# Patient Record
Sex: FEMALE | Race: WHITE | NOT HISPANIC OR LATINO | Employment: STUDENT | URBAN - METROPOLITAN AREA
[De-identification: names, ages, dates, MRNs, and addresses within clinical notes are randomized per-mention and may not be internally consistent; named-entity substitution may affect disease eponyms.]

---

## 2023-06-20 ENCOUNTER — APPOINTMENT (OUTPATIENT)
Dept: ULTRASOUND IMAGING | Facility: CLINIC | Age: 33
End: 2023-06-20
Attending: EMERGENCY MEDICINE
Payer: COMMERCIAL

## 2023-06-20 ENCOUNTER — HOSPITAL ENCOUNTER (EMERGENCY)
Facility: CLINIC | Age: 33
Discharge: HOME | End: 2023-06-20
Attending: EMERGENCY MEDICINE | Admitting: EMERGENCY MEDICINE
Payer: COMMERCIAL

## 2023-06-20 ENCOUNTER — APPOINTMENT (OUTPATIENT)
Dept: CT IMAGING | Facility: CLINIC | Age: 33
End: 2023-06-20
Payer: COMMERCIAL

## 2023-06-20 ENCOUNTER — OFFICE VISIT (OUTPATIENT)
Dept: URGENT CARE | Facility: URGENT CARE | Age: 33
End: 2023-06-20
Payer: COMMERCIAL

## 2023-06-20 VITALS
HEART RATE: 85 BPM | RESPIRATION RATE: 22 BRPM | TEMPERATURE: 99.5 F | SYSTOLIC BLOOD PRESSURE: 113 MMHG | OXYGEN SATURATION: 98 % | DIASTOLIC BLOOD PRESSURE: 70 MMHG

## 2023-06-20 VITALS
RESPIRATION RATE: 20 BRPM | WEIGHT: 122 LBS | DIASTOLIC BLOOD PRESSURE: 72 MMHG | TEMPERATURE: 99.2 F | HEART RATE: 98 BPM | SYSTOLIC BLOOD PRESSURE: 113 MMHG | OXYGEN SATURATION: 100 %

## 2023-06-20 DIAGNOSIS — R10.11 RUQ ABDOMINAL PAIN: Primary | ICD-10-CM

## 2023-06-20 DIAGNOSIS — R11.0 NAUSEA: ICD-10-CM

## 2023-06-20 DIAGNOSIS — R68.83 CHILLS: ICD-10-CM

## 2023-06-20 DIAGNOSIS — R10.84 ABDOMINAL PAIN, GENERALIZED: ICD-10-CM

## 2023-06-20 LAB
ALBUMIN SERPL BCG-MCNC: 4.6 G/DL (ref 3.5–5.2)
ALBUMIN UR-MCNC: NEGATIVE MG/DL
ALP SERPL-CCNC: 69 U/L (ref 35–104)
ALT SERPL W P-5'-P-CCNC: 13 U/L (ref 0–50)
ANION GAP SERPL CALCULATED.3IONS-SCNC: 15 MMOL/L (ref 7–15)
APPEARANCE UR: CLEAR
AST SERPL W P-5'-P-CCNC: 20 U/L (ref 0–45)
BACTERIA #/AREA URNS HPF: ABNORMAL /HPF
BASOPHILS # BLD AUTO: 0 10E3/UL (ref 0–0.2)
BASOPHILS NFR BLD AUTO: 0 %
BILIRUB SERPL-MCNC: 0.7 MG/DL
BILIRUB UR QL STRIP: NEGATIVE
BUN SERPL-MCNC: 11 MG/DL (ref 6–20)
CALCIUM SERPL-MCNC: 9.1 MG/DL (ref 8.6–10)
CHLORIDE SERPL-SCNC: 102 MMOL/L (ref 98–107)
COLOR UR AUTO: ABNORMAL
CREAT SERPL-MCNC: 0.77 MG/DL (ref 0.51–0.95)
DEPRECATED HCO3 PLAS-SCNC: 21 MMOL/L (ref 22–29)
EOSINOPHIL # BLD AUTO: 0.4 10E3/UL (ref 0–0.7)
EOSINOPHIL NFR BLD AUTO: 3 %
ERYTHROCYTE [DISTWIDTH] IN BLOOD BY AUTOMATED COUNT: 11.9 % (ref 10–15)
GFR SERPL CREATININE-BSD FRML MDRD: >90 ML/MIN/1.73M2
GLUCOSE SERPL-MCNC: 89 MG/DL (ref 70–99)
GLUCOSE UR STRIP-MCNC: NEGATIVE MG/DL
HCG SERPL QL: NEGATIVE
HCT VFR BLD AUTO: 38.3 % (ref 35–47)
HGB BLD-MCNC: 13.5 G/DL (ref 11.7–15.7)
HGB UR QL STRIP: ABNORMAL
IMM GRANULOCYTES # BLD: 0.1 10E3/UL
IMM GRANULOCYTES NFR BLD: 1 %
KETONES UR STRIP-MCNC: 40 MG/DL
LEUKOCYTE ESTERASE UR QL STRIP: NEGATIVE
LIPASE SERPL-CCNC: 19 U/L (ref 13–60)
LYMPHOCYTES # BLD AUTO: 1.8 10E3/UL (ref 0.8–5.3)
LYMPHOCYTES NFR BLD AUTO: 13 %
MCH RBC QN AUTO: 32.1 PG (ref 26.5–33)
MCHC RBC AUTO-ENTMCNC: 35.2 G/DL (ref 31.5–36.5)
MCV RBC AUTO: 91 FL (ref 78–100)
MONOCYTES # BLD AUTO: 1 10E3/UL (ref 0–1.3)
MONOCYTES NFR BLD AUTO: 7 %
MUCOUS THREADS #/AREA URNS LPF: PRESENT /LPF
NEUTROPHILS # BLD AUTO: 10 10E3/UL (ref 1.6–8.3)
NEUTROPHILS NFR BLD AUTO: 76 %
NITRATE UR QL: NEGATIVE
NRBC # BLD AUTO: 0 10E3/UL
NRBC BLD AUTO-RTO: 0 /100
PH UR STRIP: 5 [PH] (ref 5–7)
PLATELET # BLD AUTO: 219 10E3/UL (ref 150–450)
POTASSIUM SERPL-SCNC: 3.7 MMOL/L (ref 3.4–5.3)
PROT SERPL-MCNC: 7.1 G/DL (ref 6.4–8.3)
RBC # BLD AUTO: 4.2 10E6/UL (ref 3.8–5.2)
RBC URINE: 3 /HPF
SODIUM SERPL-SCNC: 138 MMOL/L (ref 136–145)
SP GR UR STRIP: 1.01 (ref 1–1.03)
SQUAMOUS EPITHELIAL: <1 /HPF
UROBILINOGEN UR STRIP-MCNC: NORMAL MG/DL
WBC # BLD AUTO: 13.2 10E3/UL (ref 4–11)
WBC URINE: 3 /HPF

## 2023-06-20 PROCEDURE — 81001 URINALYSIS AUTO W/SCOPE: CPT | Performed by: EMERGENCY MEDICINE

## 2023-06-20 PROCEDURE — 74177 CT ABD & PELVIS W/CONTRAST: CPT

## 2023-06-20 PROCEDURE — 250N000011 HC RX IP 250 OP 636: Performed by: EMERGENCY MEDICINE

## 2023-06-20 PROCEDURE — 99285 EMERGENCY DEPT VISIT HI MDM: CPT | Mod: 25

## 2023-06-20 PROCEDURE — 76705 ECHO EXAM OF ABDOMEN: CPT

## 2023-06-20 PROCEDURE — 96375 TX/PRO/DX INJ NEW DRUG ADDON: CPT

## 2023-06-20 PROCEDURE — 99204 OFFICE O/P NEW MOD 45 MIN: CPT | Performed by: PHYSICIAN ASSISTANT

## 2023-06-20 PROCEDURE — 80053 COMPREHEN METABOLIC PANEL: CPT | Performed by: EMERGENCY MEDICINE

## 2023-06-20 PROCEDURE — 96374 THER/PROPH/DIAG INJ IV PUSH: CPT | Mod: 59

## 2023-06-20 PROCEDURE — 85025 COMPLETE CBC W/AUTO DIFF WBC: CPT | Performed by: EMERGENCY MEDICINE

## 2023-06-20 PROCEDURE — 84703 CHORIONIC GONADOTROPIN ASSAY: CPT | Performed by: EMERGENCY MEDICINE

## 2023-06-20 PROCEDURE — 36415 COLL VENOUS BLD VENIPUNCTURE: CPT | Performed by: EMERGENCY MEDICINE

## 2023-06-20 PROCEDURE — 83690 ASSAY OF LIPASE: CPT | Performed by: EMERGENCY MEDICINE

## 2023-06-20 RX ORDER — ONDANSETRON 2 MG/ML
4 INJECTION INTRAMUSCULAR; INTRAVENOUS ONCE
Status: COMPLETED | OUTPATIENT
Start: 2023-06-20 | End: 2023-06-20

## 2023-06-20 RX ORDER — KETOROLAC TROMETHAMINE 15 MG/ML
15 INJECTION, SOLUTION INTRAMUSCULAR; INTRAVENOUS ONCE
Status: COMPLETED | OUTPATIENT
Start: 2023-06-20 | End: 2023-06-20

## 2023-06-20 RX ORDER — EPINEPHRINE 0.3 MG/.3ML
INJECTION SUBCUTANEOUS
COMMUNITY
Start: 2023-03-27

## 2023-06-20 RX ORDER — IOPAMIDOL 755 MG/ML
500 INJECTION, SOLUTION INTRAVASCULAR ONCE
Status: COMPLETED | OUTPATIENT
Start: 2023-06-20 | End: 2023-06-20

## 2023-06-20 RX ORDER — ONDANSETRON 4 MG/1
4 TABLET, ORALLY DISINTEGRATING ORAL EVERY 8 HOURS PRN
Qty: 10 TABLET | Refills: 0 | Status: SHIPPED | OUTPATIENT
Start: 2023-06-20 | End: 2023-06-21

## 2023-06-20 RX ORDER — DEXTROAMPHETAMINE SULFATE, DEXTROAMPHETAMINE SACCHARATE, AMPHETAMINE SULFATE AND AMPHETAMINE ASPARTATE 5; 5; 5; 5 MG/1; MG/1; MG/1; MG/1
CAPSULE, EXTENDED RELEASE ORAL
COMMUNITY
Start: 2023-05-22

## 2023-06-20 RX ORDER — BUPROPION HYDROCHLORIDE 150 MG/1
300 TABLET ORAL EVERY MORNING
COMMUNITY
Start: 2023-05-22

## 2023-06-20 RX ORDER — CLINDAMYCIN PHOSPHATE 10 MG/G
GEL TOPICAL
COMMUNITY
Start: 2022-09-06

## 2023-06-20 RX ORDER — ALBUTEROL SULFATE 90 UG/1
AEROSOL, METERED RESPIRATORY (INHALATION)
COMMUNITY
Start: 2023-03-27

## 2023-06-20 RX ADMIN — KETOROLAC TROMETHAMINE 15 MG: 15 INJECTION, SOLUTION INTRAMUSCULAR; INTRAVENOUS at 20:28

## 2023-06-20 RX ADMIN — ONDANSETRON 4 MG: 2 INJECTION INTRAMUSCULAR; INTRAVENOUS at 20:27

## 2023-06-20 RX ADMIN — IOPAMIDOL 61 ML: 755 INJECTION, SOLUTION INTRAVENOUS at 21:55

## 2023-06-20 ASSESSMENT — ACTIVITIES OF DAILY LIVING (ADL)
ADLS_ACUITY_SCORE: 33
ADLS_ACUITY_SCORE: 35

## 2023-06-20 NOTE — PROGRESS NOTES
Assessment & Plan     RUQ abdominal pain    Patient has been having worsening RUQ abdominal pain, fever, nausea  She has been unable to eat as the pain is more localized to the RUQ and worsening pain  Due to ongoing and worsening pain patient is being sent to the ED for imaging, blood work and IV fluids  Patient has been instructed to not eat or drink as she will need     Chills    Patient has been fever and chills involved in her abdominal pain    Nausea    Nausea if associated with symptoms      Review of external notes as documented elsewhere in note       CONSULTATION/REFERRAL to ED for imaging, blood work and IV fluids    No follow-ups on file.    Sterling Mora, [facility], PA-C  [facility] ISAIAH Cortez is a 32 year old, presenting for the following health issues:  Abdominal Pain (RUQ and RLQ pain and some nausea for 24 hrs  to 2 days. LMP--last week)         View : No data to display.              HPI   Review of Systems   Constitutional, HEENT, cardiovascular, pulmonary, gi and gu systems are negative, except as otherwise noted.      Objective    /72   Pulse 98   Temp 99.2  F (37.3  C)   Resp 20   Wt 55.3 kg (122 lb)   LMP 06/13/2023 (Approximate)   SpO2 100%   There is no height or weight on file to calculate BMI.  Physical Exam   GENERAL: healthy, alert and no distress  RESP: lungs clear to auscultation - no rales, rhonchi or wheezes  CV: regular rate and rhythm, normal S1 S2, no S3 or S4, no murmur, click or rub, no peripheral edema and peripheral pulses strong  ABDOMEN: tenderness RUQ  MS: no gross musculoskeletal defects noted, no edema  SKIN: no suspicious lesions or rashes  NEURO: Normal strength and tone, mentation intact and speech normal  PSYCH: mentation appears normal, affect normal/bright

## 2023-06-20 NOTE — ED TRIAGE NOTES
Pt arrives from  for 7/10 RLQ pain x2 days. Denies GI hx. VSS. Denies chance of pregnancy. No meds PTA.      Triage Assessment     Row Name 06/20/23 3701       Triage Assessment (Adult)    Airway WDL WDL       Respiratory WDL    Respiratory WDL WDL       Cardiac WDL    Cardiac WDL WDL       Cognitive/Neuro/Behavioral WDL    Cognitive/Neuro/Behavioral WDL WDL

## 2023-06-21 ENCOUNTER — OFFICE VISIT (OUTPATIENT)
Dept: URGENT CARE | Facility: URGENT CARE | Age: 33
End: 2023-06-21
Payer: COMMERCIAL

## 2023-06-21 ENCOUNTER — HOSPITAL ENCOUNTER (EMERGENCY)
Facility: CLINIC | Age: 33
Discharge: HOME | End: 2023-06-21
Attending: EMERGENCY MEDICINE | Admitting: EMERGENCY MEDICINE
Payer: COMMERCIAL

## 2023-06-21 ENCOUNTER — NURSE TRIAGE (OUTPATIENT)
Dept: NURSING | Facility: CLINIC | Age: 33
End: 2023-06-21
Payer: COMMERCIAL

## 2023-06-21 VITALS
WEIGHT: 122 LBS | HEART RATE: 88 BPM | SYSTOLIC BLOOD PRESSURE: 122 MMHG | DIASTOLIC BLOOD PRESSURE: 73 MMHG | TEMPERATURE: 98 F | OXYGEN SATURATION: 100 %

## 2023-06-21 VITALS
OXYGEN SATURATION: 99 % | HEIGHT: 64 IN | DIASTOLIC BLOOD PRESSURE: 75 MMHG | TEMPERATURE: 98 F | BODY MASS INDEX: 20.83 KG/M2 | WEIGHT: 122 LBS | SYSTOLIC BLOOD PRESSURE: 119 MMHG | HEART RATE: 99 BPM | RESPIRATION RATE: 16 BRPM

## 2023-06-21 DIAGNOSIS — R10.11 RIGHT UPPER QUADRANT ABDOMINAL PAIN: ICD-10-CM

## 2023-06-21 DIAGNOSIS — R10.11 RUQ ABDOMINAL PAIN: Primary | ICD-10-CM

## 2023-06-21 LAB
ALBUMIN SERPL BCG-MCNC: 4.3 G/DL (ref 3.5–5.2)
ALBUMIN UR-MCNC: NEGATIVE MG/DL
ALP SERPL-CCNC: 68 U/L (ref 35–104)
ALT SERPL W P-5'-P-CCNC: 10 U/L (ref 0–50)
ANION GAP SERPL CALCULATED.3IONS-SCNC: 15 MMOL/L (ref 7–15)
APPEARANCE UR: CLEAR
AST SERPL W P-5'-P-CCNC: 15 U/L (ref 0–45)
BACTERIA #/AREA URNS HPF: ABNORMAL /HPF
BASOPHILS # BLD AUTO: 0 10E3/UL (ref 0–0.2)
BASOPHILS NFR BLD AUTO: 0 %
BILIRUB SERPL-MCNC: 0.5 MG/DL
BILIRUB UR QL STRIP: NEGATIVE
BUN SERPL-MCNC: 13 MG/DL (ref 6–20)
CALCIUM SERPL-MCNC: 9.3 MG/DL (ref 8.6–10)
CHLORIDE SERPL-SCNC: 99 MMOL/L (ref 98–107)
COLOR UR AUTO: ABNORMAL
CREAT SERPL-MCNC: 0.91 MG/DL (ref 0.51–0.95)
DEPRECATED HCO3 PLAS-SCNC: 22 MMOL/L (ref 22–29)
EOSINOPHIL # BLD AUTO: 0.4 10E3/UL (ref 0–0.7)
EOSINOPHIL NFR BLD AUTO: 3 %
ERYTHROCYTE [DISTWIDTH] IN BLOOD BY AUTOMATED COUNT: 11.9 % (ref 10–15)
GFR SERPL CREATININE-BSD FRML MDRD: 86 ML/MIN/1.73M2
GLUCOSE SERPL-MCNC: 68 MG/DL (ref 70–99)
GLUCOSE UR STRIP-MCNC: NEGATIVE MG/DL
HCT VFR BLD AUTO: 36.5 % (ref 35–47)
HGB BLD-MCNC: 12.8 G/DL (ref 11.7–15.7)
HGB UR QL STRIP: ABNORMAL
IMM GRANULOCYTES # BLD: 0.1 10E3/UL
IMM GRANULOCYTES NFR BLD: 0 %
KETONES UR STRIP-MCNC: 60 MG/DL
LEUKOCYTE ESTERASE UR QL STRIP: NEGATIVE
LIPASE SERPL-CCNC: 15 U/L (ref 13–60)
LYMPHOCYTES # BLD AUTO: 1.3 10E3/UL (ref 0.8–5.3)
LYMPHOCYTES NFR BLD AUTO: 11 %
MCH RBC QN AUTO: 31.9 PG (ref 26.5–33)
MCHC RBC AUTO-ENTMCNC: 35.1 G/DL (ref 31.5–36.5)
MCV RBC AUTO: 91 FL (ref 78–100)
MONOCYTES # BLD AUTO: 0.9 10E3/UL (ref 0–1.3)
MONOCYTES NFR BLD AUTO: 7 %
MUCOUS THREADS #/AREA URNS LPF: PRESENT /LPF
NEUTROPHILS # BLD AUTO: 9.4 10E3/UL (ref 1.6–8.3)
NEUTROPHILS NFR BLD AUTO: 79 %
NITRATE UR QL: NEGATIVE
NRBC # BLD AUTO: 0 10E3/UL
NRBC BLD AUTO-RTO: 0 /100
PH UR STRIP: 5 [PH] (ref 5–7)
PLATELET # BLD AUTO: 207 10E3/UL (ref 150–450)
POTASSIUM SERPL-SCNC: 4 MMOL/L (ref 3.4–5.3)
PROT SERPL-MCNC: 7 G/DL (ref 6.4–8.3)
RBC # BLD AUTO: 4.01 10E6/UL (ref 3.8–5.2)
RBC URINE: 1 /HPF
SODIUM SERPL-SCNC: 136 MMOL/L (ref 136–145)
SP GR UR STRIP: 1 (ref 1–1.03)
SQUAMOUS EPITHELIAL: <1 /HPF
UROBILINOGEN UR STRIP-MCNC: NORMAL MG/DL
WBC # BLD AUTO: 12 10E3/UL (ref 4–11)
WBC URINE: 1 /HPF

## 2023-06-21 PROCEDURE — 85025 COMPLETE CBC W/AUTO DIFF WBC: CPT | Performed by: EMERGENCY MEDICINE

## 2023-06-21 PROCEDURE — 36415 COLL VENOUS BLD VENIPUNCTURE: CPT | Performed by: EMERGENCY MEDICINE

## 2023-06-21 PROCEDURE — 96360 HYDRATION IV INFUSION INIT: CPT

## 2023-06-21 PROCEDURE — 80053 COMPREHEN METABOLIC PANEL: CPT | Performed by: EMERGENCY MEDICINE

## 2023-06-21 PROCEDURE — 99214 OFFICE O/P EST MOD 30 MIN: CPT | Performed by: PHYSICIAN ASSISTANT

## 2023-06-21 PROCEDURE — 83690 ASSAY OF LIPASE: CPT | Performed by: EMERGENCY MEDICINE

## 2023-06-21 PROCEDURE — 258N000003 HC RX IP 258 OP 636: Performed by: EMERGENCY MEDICINE

## 2023-06-21 PROCEDURE — 99284 EMERGENCY DEPT VISIT MOD MDM: CPT | Mod: 25

## 2023-06-21 PROCEDURE — 271N000002 HC RX 271: Performed by: EMERGENCY MEDICINE

## 2023-06-21 PROCEDURE — 250N000009 HC RX 250: Performed by: EMERGENCY MEDICINE

## 2023-06-21 PROCEDURE — 250N000013 HC RX MED GY IP 250 OP 250 PS 637: Performed by: EMERGENCY MEDICINE

## 2023-06-21 PROCEDURE — 81001 URINALYSIS AUTO W/SCOPE: CPT | Performed by: EMERGENCY MEDICINE

## 2023-06-21 RX ORDER — ONDANSETRON 4 MG/1
4 TABLET, ORALLY DISINTEGRATING ORAL EVERY 8 HOURS PRN
Qty: 10 TABLET | Refills: 0 | Status: SHIPPED | OUTPATIENT
Start: 2023-06-21 | End: 2023-06-24

## 2023-06-21 RX ORDER — OMEPRAZOLE 40 MG/1
40 CAPSULE, DELAYED RELEASE ORAL DAILY
Qty: 30 CAPSULE | Refills: 0 | Status: SHIPPED | OUTPATIENT
Start: 2023-06-21 | End: 2023-07-10

## 2023-06-21 RX ORDER — LIDOCAINE HYDROCHLORIDE 20 MG/ML
10 SOLUTION OROPHARYNGEAL ONCE
Status: COMPLETED | OUTPATIENT
Start: 2023-06-21 | End: 2023-06-21

## 2023-06-21 RX ORDER — ACETAMINOPHEN 325 MG/1
650 TABLET ORAL ONCE
Status: COMPLETED | OUTPATIENT
Start: 2023-06-21 | End: 2023-06-21

## 2023-06-21 RX ORDER — MAGNESIUM HYDROXIDE/ALUMINUM HYDROXICE/SIMETHICONE 120; 1200; 1200 MG/30ML; MG/30ML; MG/30ML
15 SUSPENSION ORAL ONCE
Status: COMPLETED | OUTPATIENT
Start: 2023-06-21 | End: 2023-06-21

## 2023-06-21 RX ADMIN — ALUMINUM HYDROXIDE, MAGNESIUM HYDROXIDE, AND SIMETHICONE 15 ML: 200; 200; 20 SUSPENSION ORAL at 19:35

## 2023-06-21 RX ADMIN — LIDOCAINE HYDROCHLORIDE 10 ML: 1 POWDER at 19:35

## 2023-06-21 RX ADMIN — SODIUM CHLORIDE 1000 ML: 9 INJECTION, SOLUTION INTRAVENOUS at 17:30

## 2023-06-21 RX ADMIN — ACETAMINOPHEN 650 MG: 325 TABLET ORAL at 18:06

## 2023-06-21 ASSESSMENT — ACTIVITIES OF DAILY LIVING (ADL)
ADLS_ACUITY_SCORE: 35
ADLS_ACUITY_SCORE: 35

## 2023-06-21 NOTE — PROGRESS NOTES
"Patient presents with:  ER F/U: Still experiencing tender abdominal pain in RUQ with  nausea. Nurse Triage advise Pt mita SIGALA     (R10.11) RUQ abdominal pain  (primary encounter diagnosis)  Comment: persisting, worse with any pressure on the area or movement  Plan: To Emergency Department now for further evaluation.          SUBJECTIVE:   Erendira Palacio is a 32 year old female who presents today with persistent right upper quadrant pain.  She was evaluated in the emergency department yesterday, with nonspecific findings.  She was prescribed Zofran and discharged.  She states that her pain has not improved, but yet not worsened.  She denies any urinary symptoms such as dysuria or urgency, but thinks she may have been urinating somewhat more frequently.  Denies any vaginal discharge.  Denies any flank pain.  Denies any suprapubic pain.  Normal bowel movements.    She states that her pain is a 7 out of 10 at rest, up to 10 out of 10 with palpation in the area.    LMP 6/12-6/14    Denies any urinary urgency, possibly increased a bit.    Denies any vaginal discharge.  Yesterday fever    Last ibuprofen 7:30am today  Last tylenol at 10am.      Started about a week ago, feeling \"sick\" with fever and malaise, thought it was just because of her menstrual cycle. Had lots of fatigue, chills, flank pain.      Pain localized into RUQ 2 days ago, worsened.  Fever has been low grade in ED and clinic.  Complains of cold sweats.      Was seen in the ED yesterday and given zofran with no specific etiology of her pain.  CT scan revealed Slight heterogeneity of the right kidney relative to the left with a trace amount of pelvic fluid.    Limited ultrasound of RUQ was normal.      Nausea without vomiting, hasn't had anything to eat today, tried to eat cheerios, but had worsening pain when she tried to eat.    Denies any recent alcohol use, her last alcohol use was in 2019.      History reviewed. No pertinent past medical " history.      Current Outpatient Medications   Medication Sig Dispense Refill     Multiple Vitamins-Iron (DAILY-EZEKIEL/IRON/BETA-CAROTENE) TABS TAKE 1 TABLET BY MOUTH DAILY. (Patient not taking: Reported on 10/19/2020) 30 tablet 7     Social History     Tobacco Use     Smoking status: Never Smoker     Smokeless tobacco: Never Used   Substance Use Topics     Alcohol use: Not on file     Family History   Problem Relation Age of Onset     Diabetes Mother      Diabetes Father          ROS:    10 point ROS of systems including Constitutional, Eyes, Respiratory, Cardiovascular, Gastroenterology, Genitourinary, Integumentary, Muscularskeletal, Psychiatric ,neurological were all negative except for pertinent positives noted in my HPI       OBJECTIVE:  /73 (BP Location: Right arm, Patient Position: Chair, Cuff Size: Adult Regular)   Pulse 88   Temp 98  F (36.7  C) (Tympanic)   Wt 55.3 kg (122 lb)   LMP 06/13/2023 (Approximate)   SpO2 100%   Physical Exam:  GENERAL APPEARANCE: healthy, alert and no distress  RESP: lungs clear to auscultation - no rales, rhonchi or wheezes  CV: regular rates and rhythm, normal S1 S2, no murmur noted  ABDOMEN: soft, significant right upper quadrant tenderness to palpation, no rebound.  No masses.  SKIN: no suspicious lesions or rashes

## 2023-06-21 NOTE — DISCHARGE INSTRUCTIONS
- Follow up with primary care provider in the next 1 week  - Zofran every 8 hours as needed for nausea  - Ibuprofen and Tylenol for pain  - Return for fevers, uncontrolled nausea or vomiting, worsening abdominal pain    Discharge Instructions  Abdominal Pain    Abdominal pain (belly pain) can be caused by many things. Your evaluation today does not show the exact cause for your pain. Your provider today has decided that it is unlikely your pain is due to a life threatening problem, or a problem requiring surgery or hospital admission. Sometimes those problems cannot be found right away, so it is very important that you follow up as directed.  Sometimes only the changes which occur over time allow the cause of your pain to be found.    Generally, every Emergency Department visit should have a follow-up clinic visit with either a primary or a specialty clinic/provider. Please follow-up as instructed by your emergency provider today. With abdominal pain, we often recommend very close follow-up, such as the following day.    ADULTS:  Return to the Emergency Department right away if:    You get an oral temperature above 102oF or as directed by your provider.  You have blood in your stools. This may be bright red or appear as black, tarry stools.    You keep vomiting (throwing up) or cannot drink liquids.  You see blood when you vomit.   You cannot have a bowel movement or you cannot pass gas.  Your stomach gets bloated or bigger.  Your skin or the whites of your eyes look yellow.  You faint.  You have bloody, frequent or painful urination (peeing).  You have new symptoms or anything that worries you.    CHILDREN:  Return to the Emergency Department right away if your child has any of the above-listed symptoms or the following:    Pushes your hand away or screams/cries when his/her belly is touched.  You notice your child is very fussy or weak.  Your child is very tired and is too tired to eat or drink.  Your child is  dehydrated.  Signs of dehydration can be:  Significant change in the amount of wet diapers/urine.  Your infant or child starts to have dry mouth and lips, or no saliva (spit) or tears.    PREGNANT WOMEN:  Return to the Emergency Department right away if you have any of the above-listed symptoms or the following:    You have bleeding, leaking fluid or passing tissue from the vagina.  You have worse pain or cramping, or pain in your shoulder or back.  You have vomiting that will not stop.  You have a temperature of 100oF or more.  Your baby is not moving as much as usual.  You faint.  You get a bad headache with or without eye problems and abdominal pain.  You have a seizure.  You have unusual discharge from your vagina and abdominal pain.    Abdominal pain is pretty common during pregnancy.  Your pain may or may not be related to your pregnancy. You should follow-up closely with your OB provider so they can evaluate you and your baby.  Until you follow-up with your regular provider, do the following:     Avoid sex and do not put anything in your vagina.  Drink clear fluids.  Only take medications approved by your provider.    MORE INFORMATION:    Appendicitis:  A possible cause of abdominal pain in any person who still has their appendix is acute appendicitis. Appendicitis is often hard to diagnose.  Testing does not always rule out early appendicitis or other causes of abdominal pain. Close follow-up with your provider and re-evaluations may be needed to figure out the reason for your abdominal pain.    Follow-up:  It is very important that you make an appointment with your clinic and go to the appointment.  If you do not follow-up with your primary provider, it may result in missing an important development which could result in permanent injury or disability and/or lasting pain.  If there is any problem keeping your appointment, call your provider or return to the Emergency Department.    Medications:  Take your  "medications as directed by your provider today.  Before using over-the-counter medications, ask your provider and make sure to take the medications as directed.  If you have any questions about medications, ask your provider.    Diet:  Resume your normal diet as much as possible, but do not eat fried, fatty or spicy foods while you have pain.  Do not drink alcohol or have caffeine.  Do not smoke tobacco.    Probiotics: If you have been given an antibiotic, you may want to also take a probiotic pill or eat yogurt with live cultures. Probiotics have \"good bacteria\" to help your intestines stay healthy. Studies have shown that probiotics help prevent diarrhea (loose stools) and other intestine problems (including C. diff infection) when you take antibiotics. You can buy these without a prescription in the pharmacy section of the store.     If you were given a prescription for medicine here today, be sure to read all of the information (including the package insert) that comes with your prescription.  This will include important information about the medicine, its side effects, and any warnings that you need to know about.  The pharmacist who fills the prescription can provide more information and answer questions you may have about the medicine.  If you have questions or concerns that the pharmacist cannot address, please call or return to the Emergency Department.       Remember that you can always come back to the Emergency Department if you are not able to see your regular provider in the amount of time listed above, if you get any new symptoms, or if there is anything that worries you.    "

## 2023-06-21 NOTE — ED PROVIDER NOTES
ED ATTENDING PHYSICIAN NOTE:   I evaluated this patient in conjunction with PATTI Newberry. I have participated in the care of the patient and personally performed key elements of the history, exam, and medical decision making.      HPI:   Erendira Palacio is a 32 year old female who presents with right sided abdominal pain since yesterday. The pain initially started in RLQ, but has since moved to RUQ. Also, it radiates to the back and right shoulder blade. Other symptoms mentioned at bedside include nausea and vomiting. She denies recent travels or hormonal birth control use. She further denies ever, chills, diarrhea, constipation, vaginal discharge, vaginal bleeding, dysuria, hematuria, urgency, chest pain, shortness of breath, or leg swelling.        EXAM:   /70   Pulse 85   Temp 99.5  F (37.5  C) (Temporal)   Resp 22   LMP 06/13/2023 (Approximate)   SpO2 98%   General: Alert, appears well-developed and well-nourished. Cooperative.     In mild distress  HEENT:  Head:  Atraumatic  Ears:  External ears are normal  Mouth/Throat:  Oropharynx is without erythema or exudate and mucous membranes are moist.   Eyes:   Conjunctivae normal and EOM are normal. No scleral icterus.  CV:  Normal rate, regular rhythm, normal heart sounds and radial pulses are 2+ and symmetric.  No murmur.  Resp:  Breath sounds are clear bilaterally    Non-labored, no retractions or accessory muscle use  GI:  Abdomen is soft, no distension, RUQ tenderness. No rebound or guarding.  No CVA tenderness bilaterally  MS:  Normal range of motion. No edema.    Back atraumatic.    No midline cervical, thoracic, or lumbar tenderness  Skin:  Warm and dry.  No rash or lesions noted.  Neuro: Alert. Normal strength.  GCS: 15  Psych:  Normal mood and affect.    Labs Ordered and Resulted from Time of ED Arrival to Time of ED Departure   COMPREHENSIVE METABOLIC PANEL - Abnormal       Result Value    Sodium 138      Potassium 3.7      Chloride 102       Carbon Dioxide (CO2) 21 (*)     Anion Gap 15      Urea Nitrogen 11.0      Creatinine 0.77      Calcium 9.1      Glucose 89      Alkaline Phosphatase 69      AST 20      ALT 13      Protein Total 7.1      Albumin 4.6      Bilirubin Total 0.7      GFR Estimate >90     ROUTINE UA WITH MICROSCOPIC REFLEX TO CULTURE - Abnormal    Color Urine Light Yellow      Appearance Urine Clear      Glucose Urine Negative      Bilirubin Urine Negative      Ketones Urine 40 (*)     Specific Gravity Urine 1.011      Blood Urine Moderate (*)     pH Urine 5.0      Protein Albumin Urine Negative      Urobilinogen Urine Normal      Nitrite Urine Negative      Leukocyte Esterase Urine Negative      Bacteria Urine Few (*)     Mucus Urine Present (*)     RBC Urine 3 (*)     WBC Urine 3      Squamous Epithelials Urine <1     CBC WITH PLATELETS AND DIFFERENTIAL - Abnormal    WBC Count 13.2 (*)     RBC Count 4.20      Hemoglobin 13.5      Hematocrit 38.3      MCV 91      MCH 32.1      MCHC 35.2      RDW 11.9      Platelet Count 219      % Neutrophils 76      % Lymphocytes 13      % Monocytes 7      % Eosinophils 3      % Basophils 0      % Immature Granulocytes 1      NRBCs per 100 WBC 0      Absolute Neutrophils 10.0 (*)     Absolute Lymphocytes 1.8      Absolute Monocytes 1.0      Absolute Eosinophils 0.4      Absolute Basophils 0.0      Absolute Immature Granulocytes 0.1      Absolute NRBCs 0.0     LIPASE - Normal    Lipase 19     HCG QUALITATIVE PREGNANCY - Normal    hCG Serum Qualitative Negative       CT Abdomen Pelvis w Contrast  Final Result  IMPRESSION:   1.  Slight heterogeneity of the right kidney relative to the left. Bladder is underdistended reducing evaluation for wall thickening. Correlate for cystitis/pyelonephritis.  2.  Trace amount pelvic free fluid.    Abdomen US, limited (RUQ only)  Final Result  IMPRESSION:  1.  Normal limited abdominal ultrasound.    MEDICAL DECISION MAKING/ASSESSMENT AND PLAN:   This patient presents  with abdominal pain.  The differential diagnosis is broad and includes:  Appendicitis, cholecystitis, peptic ulcer disease, diverticulitis, bowel obstruction, ischemia, pancreatitis, amongst others.  Based on clinical exam, laboratory testing, and imaging, no significant etiologies were found.  The pain has improved with interventions in the ED.  The exact etiology of the pain is not clear at this time.  The patient will be discharged, and was warned that persistent or worsening symptoms should prompt re-examination by a physician (ED if necessary) in 8-12 hours.       DIAGNOSIS:     ICD-10-CM    1. Abdominal pain, generalized  R10.84 Primary Care Referral      2. Nausea  R11.0 ondansetron (ZOFRAN ODT) 4 MG ODT tab          DISPOSITION:   Discharged home.      6/20/2023  [facility] EMERGENCY DEPT       Jethro Silva MD  06/21/23 8100

## 2023-06-21 NOTE — PATIENT INSTRUCTIONS
(R10.11) RUQ abdominal pain  (primary encounter diagnosis)  Comment: persisting, worse with any pressure on the area or movement  Plan: To Emergency Department now for further evaluation.

## 2023-06-21 NOTE — ED PROVIDER NOTES
"  History     Chief Complaint:  Abdominal pain    The patient provides the history    Erendira Palacio is a 32 year old female who presents with sharp and throbbing abdominal pain with nausea without vomiting, sweats and low grade fever since 6/19/23 that started as RLQ abdominal pain and has now radiated to the RUQ. It also has radiated onto the back and right shoulder blade. It worse when eating and has decreased appetite due to the pain. She had two bites of cheerios this morning and was in pain. She denies diarrhea. She had a salpingectomy 2 years ago and had both tubes out.       Independent Historian:   None - Patient Only    Review of External Notes:   I reviewed the urgent care notes from today and ER notes from [facility] yesterday.      Medications:    ADDERALL XR 20 MG 24 hr capsule  buPROPion (WELLBUTRIN XL) 150 MG 24 hr tablet  clindamycin (CLINDAMAX) 1 % external gel  EPINEPHrine (ANY BX GENERIC EQUIV) 0.3 MG/0.3ML injection 2-pack  ondansetron (ZOFRAN ODT) 4 MG ODT tab  VENTOLIN  (90 Base) MCG/ACT inhaler        Past Medical History:    She denies any past medical history    Past Surgical History:    Salpingectomy    Physical Exam     Patient Vitals for the past 24 hrs:   BP Temp Temp src Pulse Resp SpO2 Height Weight   06/21/23 1551 119/75 98  F (36.7  C) Oral 99 16 99 % 1.626 m (5' 4\") 55.3 kg (122 lb)        Physical Exam  General: Alert, No distress. Nontoxic appearance  Head: No signs of trauma.   Mouth/Throat: Oropharynx moist.   Eyes: Conjunctivae are normal. Pupils are equal..   Neck: Normal range of motion.    CV: Appears well perfused.  Resp:No respiratory distress.   MSK: Normal range of motion. No obvious deformity.   Abdominal: Right upper quadrant tenderness.  Neuro: The patient is alert and interactive. IRAHE[place]. Speech normal. GCS 15  Skin: No lesion or sign of trauma noted.   Psych: normal mood and affect. behavior is normal.     Emergency Department Course     Laboratory:  Labs " Ordered and Resulted from Time of ED Arrival to Time of ED Departure   COMPREHENSIVE METABOLIC PANEL - Abnormal       Result Value    Sodium 136      Potassium 4.0      Chloride 99      Carbon Dioxide (CO2) 22      Anion Gap 15      Urea Nitrogen 13.0      Creatinine 0.91      Calcium 9.3      Glucose 68 (*)     Alkaline Phosphatase 68      AST 15      ALT 10      Protein Total 7.0      Albumin 4.3      Bilirubin Total 0.5      GFR Estimate 86     ROUTINE UA WITH MICROSCOPIC REFLEX TO CULTURE - Abnormal    Color Urine Straw      Appearance Urine Clear      Glucose Urine Negative      Bilirubin Urine Negative      Ketones Urine 60 (*)     Specific Gravity Urine 1.005      Blood Urine Small (*)     pH Urine 5.0      Protein Albumin Urine Negative      Urobilinogen Urine Normal      Nitrite Urine Negative      Leukocyte Esterase Urine Negative      Bacteria Urine Few (*)     Mucus Urine Present (*)     RBC Urine 1      WBC Urine 1      Squamous Epithelials Urine <1     CBC WITH PLATELETS AND DIFFERENTIAL - Abnormal    WBC Count 12.0 (*)     RBC Count 4.01      Hemoglobin 12.8      Hematocrit 36.5      MCV 91      MCH 31.9      MCHC 35.1      RDW 11.9      Platelet Count 207      % Neutrophils 79      % Lymphocytes 11      % Monocytes 7      % Eosinophils 3      % Basophils 0      % Immature Granulocytes 0      NRBCs per 100 WBC 0      Absolute Neutrophils 9.4 (*)     Absolute Lymphocytes 1.3      Absolute Monocytes 0.9      Absolute Eosinophils 0.4      Absolute Basophils 0.0      Absolute Immature Granulocytes 0.1      Absolute NRBCs 0.0     LIPASE - Normal    Lipase 15          Emergency Department Course & Assessments:       Interventions:  Medications   0.9% sodium chloride BOLUS (0 mLs Intravenous Stopped 6/21/23 1804)   acetaminophen (TYLENOL) tablet 650 mg (650 mg Oral $Given 6/21/23 1806)   alum & mag hydroxide-simethicone (MAALOX) suspension 15 mL (15 mLs Oral $Given 6/21/23 1935)   lidocaine (viscous)  (XYLOCAINE) 2 % solution 10 mL (10 mLs Mouth/Throat $Given 6/21/23 1935)        Assessments:  1625 I examined the patient and obtained the history above    Independent Interpretation (X-rays, CTs, rhythm strip):  None    Consultations/Discussion of Management or Tests:  I consulted with Dr. Santos, gastroenterology.  He will ensure close follow-up with the patient for EGD.       Social Determinants of Health affecting care:   Stress/Adjustment Disorders     Disposition:  The patient was discharged to home.     Impression & Plan    CMS Diagnoses: None    Medical Decision Making:  This patient is presenting to the ED complaining of abdominal pain.  The pain is in her right upper quadrant.  CT and ultrasound done yesterday at [facility] are negative for cause of her pain.  The patient was given a GI cocktail without improvement.  Laboratory studies compared to previous are unchanged.  No signs of UTI or pyelonephritis.  No signs of appendicitis or bowel obstruction.  No signs of cholecystitis/choledocholithiasis.  Her presentation is possibly consistent with peptic ulcer disease versus gastritis.  Arrangements were made for her to undergo EGD as an outpatient.  She will be started on a PPI.    Diagnosis:    ICD-10-CM    1. Right upper quadrant abdominal pain  R10.11            Discharge Medications:  Discharge Medication List as of 6/21/2023  8:29 PM      START taking these medications    Details   omeprazole (PRILOSEC) 40 MG DR capsule Take 1 capsule (40 mg) by mouth daily for 30 days, Disp-30 capsule, R-0, Local Print              Scribe Disclosure:  JOSE LUIS GARZA, am serving as a scribe at 4:31 PM on 6/21/2023 to document services personally performed by Ludin Thakur MD based on my observations and the provider's statements to me.   6/21/2023   Ludin Thakur MD Joing, Todd Roger, MD  06/21/23 0797

## 2023-06-21 NOTE — ED TRIAGE NOTES
Pt has abd pain and for the past few days   Pt was seen at the [facility] yesterday and told if it doesn't go away to come in today

## 2023-06-21 NOTE — ED PROVIDER NOTES
History     Chief Complaint:  Abdominal Pain     HPI   Erendira Palacio is a 32 year old female presenting today for evaluation of right-sided abdominal pain since yesterday. Pain was initially in the right lower quadrant but now seems to be more in the right upper quadrant radiating to the back and right shoulder blade.  She has associated nausea.  No fevers, chills, diarrhea, constipation, vaginal discharge, vaginal bleeding, dysuria, hematuria, urgency.  She denies chest pain, recent travel, hormonal birth control, difficulty breathing, lower extremity swelling.    Independent Historian:   None - Patient Only    Review of External Notes:   Urgent care visit from 6/20/23.      Medications:    ondansetron (ZOFRAN ODT) 4 MG ODT tab  ADDERALL XR 20 MG 24 hr capsule  buPROPion (WELLBUTRIN XL) 150 MG 24 hr tablet  clindamycin (CLINDAMAX) 1 % external gel  EPINEPHrine (ANY BX GENERIC EQUIV) 0.3 MG/0.3ML injection 2-pack  VENTOLIN  (90 Base) MCG/ACT inhaler      Past Medical History:    No past medical history on file.    Past Surgical History:    No past surgical history on file.     Physical Exam     Patient Vitals for the past 24 hrs:   BP Temp Temp src Pulse Resp SpO2   06/20/23 2123 -- -- -- -- -- 98 %   06/20/23 2122 113/70 -- -- 85 -- --   06/20/23 2034 -- -- -- -- -- 98 %   06/20/23 2033 116/66 -- -- 82 -- --   06/20/23 2030 116/66 -- -- -- -- --   06/20/23 1828 118/75 99.5  F (37.5  C) Temporal 102 22 100 %        Physical Exam  /70   Pulse 85   Temp 99.5  F (37.5  C) (Temporal)   Resp 22   LMP 06/13/2023 (Approximate)   SpO2 98%   General: Well-developed and well-nourished. Patient appears comfortable.   Head: Atraumatic. Normocephalic.  EENT: PERRL. EOMI. Moist mucus membranes.   CV: Regular rate and rhythm. No appreciable murmurs, rubs, or gallops.   Respiratory: Breathing comfortably on room air. Lungs clear to auscultation bilaterally without wheezes, rhonchi, or rales.  GI: Soft,  non-distended. Right upper quadrant tenderness. No rebound, rigidity, or guarding. No CVA tenderness.   Msk: Extremities without tenderness to palpation or deformity.  Skin: Warm and dry. No rashes.  Neuro: Awake, alert, and conversant. No focal neurologic deficits.   Psych: Appropriate mood and affect.    Emergency Department Course   Imaging:  CT Abdomen Pelvis w Contrast   Final Result   IMPRESSION:    1.  Slight heterogeneity of the right kidney relative to the left. Bladder is underdistended reducing evaluation for wall thickening. Correlate for cystitis/pyelonephritis.   2.  Trace amount pelvic free fluid.      Abdomen US, limited (RUQ only)   Final Result   IMPRESSION:   1.  Normal limited abdominal ultrasound.               Report per radiology    Laboratory:  Labs Ordered and Resulted from Time of ED Arrival to Time of ED Departure   COMPREHENSIVE METABOLIC PANEL - Abnormal       Result Value    Sodium 138      Potassium 3.7      Chloride 102      Carbon Dioxide (CO2) 21 (*)     Anion Gap 15      Urea Nitrogen 11.0      Creatinine 0.77      Calcium 9.1      Glucose 89      Alkaline Phosphatase 69      AST 20      ALT 13      Protein Total 7.1      Albumin 4.6      Bilirubin Total 0.7      GFR Estimate >90     ROUTINE UA WITH MICROSCOPIC REFLEX TO CULTURE - Abnormal    Color Urine Light Yellow      Appearance Urine Clear      Glucose Urine Negative      Bilirubin Urine Negative      Ketones Urine 40 (*)     Specific Gravity Urine 1.011      Blood Urine Moderate (*)     pH Urine 5.0      Protein Albumin Urine Negative      Urobilinogen Urine Normal      Nitrite Urine Negative      Leukocyte Esterase Urine Negative      Bacteria Urine Few (*)     Mucus Urine Present (*)     RBC Urine 3 (*)     WBC Urine 3      Squamous Epithelials Urine <1     CBC WITH PLATELETS AND DIFFERENTIAL - Abnormal    WBC Count 13.2 (*)     RBC Count 4.20      Hemoglobin 13.5      Hematocrit 38.3      MCV 91      MCH 32.1      MCHC  35.2      RDW 11.9      Platelet Count 219      % Neutrophils 76      % Lymphocytes 13      % Monocytes 7      % Eosinophils 3      % Basophils 0      % Immature Granulocytes 1      NRBCs per 100 WBC 0      Absolute Neutrophils 10.0 (*)     Absolute Lymphocytes 1.8      Absolute Monocytes 1.0      Absolute Eosinophils 0.4      Absolute Basophils 0.0      Absolute Immature Granulocytes 0.1      Absolute NRBCs 0.0     LIPASE - Normal    Lipase 19     HCG QUALITATIVE PREGNANCY - Normal    hCG Serum Qualitative Negative        Emergency Department Course & Assessments:  Interventions:  Medications   ondansetron (ZOFRAN) injection 4 mg (4 mg Intravenous $Given 6/20/23 2027)   ketorolac (TORADOL) injection 15 mg (15 mg Intravenous $Given 6/20/23 2028)   iopamidol (ISOVUE-370) solution 500 mL (61 mLs Intravenous $Given 6/20/23 2155)   sodium chloride (PF) 0.9% PF flush 100 mL (55 mLs Intravenous $Given 6/20/23 2155)        Assessments and Consultations:  1915   I performed my initial evaluation of the patient    Patient seen in conjunction with Dr. Jethro Silva.    Independent Interpretation (X-rays, CTs, rhythm strip):  None    Social Determinants of Health affecting care:   None    Disposition:  The patient was discharged to home.     Impression & Plan    Medical Decision Making:  This is a 32-year-old female with 2 days of right upper quadrant pain radiating to the back and up to the right shoulder blade.  Differential diagnosis included cholecystitis, cholelithiasis, biliary colic, pyelonephritis, kidney stone, appendicitis, ovarian torsion, ovarian cyst, constipation.  Work-up pursued as above.  I briefly considered an atypical presentation of a PE, but she is PERC negative.  No vaginal symptoms. She had improvement of her pain and nausea here with Toradol and Zofran.  Given high suspicion for gallbladder pathology, right upper quadrant ultrasound was ordered and was normal.  Given her persistent pain, I did order an  abdominal CT.  This showed possible cystitis or pyelonephritis; however she had no urinary symptoms and her urinalysis was negative for infection, so I did not feel it was necessary to treat with antibiotics.   She had a mild elevation in her white blood cell count.  This could certainly be viral.      We discussed that the etiology of her symptoms is unknown at this time.  I recommended conservative and symptomatic management at home with Zofran, ibuprofen, Tylenol.  She should certainly return if she develops a fever, uncontrollable abdominal pain, uncontrolled vomiting or with new symptoms.  I recommended she follow-up closely with primary care in the next week, and she informed she does not a primary care provider.  Primary care referral was placed.  She will be sent home with prescription for Zofran.  Her vital signs remained stable while she was in the emergency department and I feel she is safe for home management.    Critical Care time:  was 0 minutes for this patient excluding procedures.    Diagnosis:    ICD-10-CM    1. Abdominal pain, generalized  R10.84 Primary Care Referral      2. Nausea  R11.0 ondansetron (ZOFRAN ODT) 4 MG ODT tab           Discharge Medications:  Discharge Medication List as of 6/20/2023 10:51 PM      START taking these medications    Details   ondansetron (ZOFRAN ODT) 4 MG ODT tab Take 1 tablet (4 mg) by mouth every 8 hours as needed for nausea, Disp-10 tablet, R-0, E-Prescribe              June 20, 2023   MUNA Newberry Amanda, PA-C  06/21/23 0040

## 2023-06-21 NOTE — ED NOTES
"PIT/Triage Evaluation    Patient presented with 2 days of right upper quadrant pain.  The patient states that she has been noticing right upper abdominal pain and over the last couple of days it seems to be moving towards the back and even up to her right shoulder blade.  She denies any left-sided symptoms.  She states that she has not noticed any hematuria nor dysuria but her urine seems to maybe have been decreased a little bit today though she notes that she has been eating less as food intake causes her pain to get worse.  She has been trying to stay hydrated.    Exam is notable for:    Patient Vitals for the past 24 hrs:   BP Temp Temp src Pulse Resp SpO2   06/20/23 1828 118/75 99.5  F (37.5  C) Temporal 102 22 100 %       Constitutional: Vital signs reviewed as above  Cardiovascular:    Tachycardic rate, regular rhythm and normal heart sounds.     Exam reveals no friction rub.     No murmur heard.  Pulmonary/Chest:    Effort normal and breath sounds normal.    No respiratory distress.    There are no wheezes.    There are no rales.   Gastrointestinal:    Soft.    Bowel sounds normal.    There is no distension.    There is RUQ, epigastric, and right mid/lateral abdominal tenderness.    No LUQ or LLQ tenderness   There is no rebound or guarding.   Musculoskeletal:    Normal range of motion.    Normal Tone   No focal CVA tenderness to percussion      Appropriate interventions for symptom management were initiated if applicable.  Appropriate diagnostic tests were initiated if indicated.    Important information for subsequent clinician:    Symptoms seem consistent with right upper quadrant abdominal pain/biliary issues. (Nursing note states \"RLQ\" pain).  work-up ordered as appropriate.    Urgent care note available from today noting right upper quadrant pain.    I briefly evaluated the patient and developed an initial plan of care. I discussed this plan and explained that this brief interaction does not constitute " a full evaluation. Patient/family understands that they should wait to be fully evaluated and discuss any test results with another clinician prior to leaving the hospital.     Al Jo DO  06/20/23 2015

## 2023-06-21 NOTE — TELEPHONE ENCOUNTER
Pt is phoning stating that she was seen in the ED yesterday 06/21/2023    Pt is having pain on the right side of her abdomen and nausea    Rates pain 6/10    Pt temperature 99.5    Per disposition: Go To ED/C Now (Or To Office With PCP Approval)    Pt will be going to [facility] for evaluation now     Care advice given per protocol and when to call back. Pt verbalized understanding and agrees to plan of care.    Tara Carter RN  [facility] Nurse Advisor  12:44 PM 6/21/2023            Reason for Disposition    Constant abdominal pain lasting > 2 hours    Additional Information    Negative: Passed out (i.e., fainted, collapsed and was not responding)    Negative: Sounds like a life-threatening emergency to the triager    Negative: Shock suspected (e.g., cold/pale/clammy skin, too weak to stand, low BP, rapid pulse)    Negative: Chest pain    Negative: Pain is mainly in upper abdomen (if needed ask: 'is it mainly above the belly button?')    Negative: Abdominal pain and pregnant < 20 weeks    Negative: Abdominal pain and pregnant 20 or more weeks    Negative: SEVERE abdominal pain (e.g., excruciating)    Negative: Vomiting red blood or black (coffee ground) material    Negative: Bloody, black, or tarry bowel movements  (Exception: Chronic-unchanged black-grey bowel movements and is taking iron pills or Pepto-Bismol.)    Protocols used: ABDOMINAL PAIN - FEMALE-A-OH

## 2023-07-10 ENCOUNTER — OFFICE VISIT (OUTPATIENT)
Dept: INTERNAL MEDICINE | Facility: CLINIC | Age: 33
End: 2023-07-10
Payer: COMMERCIAL

## 2023-07-10 VITALS
OXYGEN SATURATION: 100 % | WEIGHT: 119.5 LBS | TEMPERATURE: 98.8 F | HEART RATE: 78 BPM | RESPIRATION RATE: 14 BRPM | SYSTOLIC BLOOD PRESSURE: 104 MMHG | DIASTOLIC BLOOD PRESSURE: 68 MMHG | HEIGHT: 64 IN | BODY MASS INDEX: 20.4 KG/M2

## 2023-07-10 DIAGNOSIS — R10.84 ABDOMINAL PAIN, GENERALIZED: Primary | ICD-10-CM

## 2023-07-10 DIAGNOSIS — R21 RASH: ICD-10-CM

## 2023-07-10 PROCEDURE — 96127 BRIEF EMOTIONAL/BEHAV ASSMT: CPT | Performed by: INTERNAL MEDICINE

## 2023-07-10 PROCEDURE — 99213 OFFICE O/P EST LOW 20 MIN: CPT | Performed by: INTERNAL MEDICINE

## 2023-07-10 RX ORDER — IBUPROFEN 200 MG
200 TABLET ORAL EVERY 4 HOURS PRN
COMMUNITY

## 2023-07-10 RX ORDER — TRIAMCINOLONE ACETONIDE 1 MG/G
CREAM TOPICAL 2 TIMES DAILY
Qty: 30 G | Refills: 0 | Status: SHIPPED | OUTPATIENT
Start: 2023-07-10

## 2023-07-10 ASSESSMENT — PATIENT HEALTH QUESTIONNAIRE - PHQ9
10. IF YOU CHECKED OFF ANY PROBLEMS, HOW DIFFICULT HAVE THESE PROBLEMS MADE IT FOR YOU TO DO YOUR WORK, TAKE CARE OF THINGS AT HOME, OR GET ALONG WITH OTHER PEOPLE: EXTREMELY DIFFICULT
SUM OF ALL RESPONSES TO PHQ QUESTIONS 1-9: 20
SUM OF ALL RESPONSES TO PHQ QUESTIONS 1-9: 20

## 2023-07-10 NOTE — PROGRESS NOTES
Assessment & Plan     Abdominal pain, generalized  Symptoms have improved.  Her symptoms have all but resolved.  We discussed options available at this point we have suggested observation.  If her symptoms recur she may consider a pelvic ultrasound to rule out an ovarian cyst.  Of note her CT scan did demonstrate the presence of some free fluid.  - Primary Care Referral    Rash  Suggest applying topical triamcinolone cream twice daily.  Patient was advised to avoid mucous membranes.  - triamcinolone (KENALOG) 0.1 % external cream; Apply topically 2 times daily , do not apply on face     MED REC REQUIRED  Post Medication Reconciliation Status: discharge medications reconciled, continue medications without change  Depression Screening Follow Up        7/10/2023     8:34 AM   PHQ   PHQ-9 Total Score 20   Q9: Thoughts of better off dead/self-harm past 2 weeks Several days   F/U: Thoughts of suicide or self-harm Yes   F/U: Self harm-plan No   F/U: Self-harm action No   F/U: Safety concerns No         See Patient Instructions    Alexander Stacy MD  [facility]    Rodrigo Cortez is a 32 year old, presenting for the following health issues:  ER F/U and Rash (Right hip )    HPI     Patient is a new patient having never been seen prior at the clinic was seen in the emergency room for abdominal pain.  She was started on a proton pump inhibitor and scheduled to follow-up with GI for potential upper endoscopy.  Imaging studies and lab results were reviewed.    ED/UC Followup:    Facility:  [facility]  Date of visit: 6/21/23  Reason for visit: Right upper quadrant abdominal pain   Current Status: Improved. Patient states she thinks pain may be related to menstrual period     Other concerns:  1. Rash on right hip x 1+ week, using benadryl cream.  Denies any known exposures.  The rash is primarily to the right hip.  She has been using topical Benadryl cream and some antibiotic  "solution.    Patient states that she has improved significantly from when she is in the emergency room.  She did not ever take the proton pump inhibitor that was ordered for her.  She states she was never contacted by GI for assessment.  She reports that she feels that her symptoms were related to her menstrual cycle.  She denies similar complaints although she has had a laparoscopic procedure in the past.  She denies any other focal complaints.    She informs me that she is not using any alcohol.  We discussed her PHQ-9 and she states that she has a psychiatrist that she is involved with in regards to her mental health.    Review of Systems   CONSTITUTIONAL: NEGATIVE for fever, chills, change in weight  EYES: NEGATIVE for vision changes or irritation  ENT/MOUTH: NEGATIVE for ear, mouth and throat problems  RESP: NEGATIVE for significant cough or SOB  CV: NEGATIVE for chest pain, palpitations or peripheral edema  GI: NEGATIVE for nausea, heartburn, or change in bowel habits  : NEGATIVE for frequency, dysuria, or hematuria  MUSCULOSKELETAL: NEGATIVE for significant arthralgias or myalgia  NEURO: NEGATIVE for weakness, dizziness or paresthesias  HEME: NEGATIVE for bleeding problems        Objective    /68   Pulse 78   Temp 98.8  F (37.1  C) (Temporal)   Resp 14   Ht 1.626 m (5' 4\")   Wt 54.2 kg (119 lb 8 oz)   LMP 07/09/2023 (Exact Date)   SpO2 100%   BMI 20.51 kg/m    Body mass index is 20.51 kg/m .     Physical Exam   GENERAL: alert and no distress  RESP: lungs clear to auscultation - no rales, rhonchi or wheezes  CV: regular rate and rhythm, normal S1 S2, no S3 or S4, no murmur, click or rub  ABDOMEN: soft, nontender, no hepatosplenomegaly, no masses and bowel sounds normal  Skin:    MS: no gross musculoskeletal defects noted  NEURO: No focal changes  PSYCH: mentation appears normal, affect normal/bright                  Answers for HPI/ROS submitted by the patient on 7/10/2023  If you checked off " any problems, how difficult have these problems made it for you to do your work, take care of things at home, or get along with other people?: Extremely difficult  PHQ9 TOTAL SCORE: 20

## 2023-07-30 ENCOUNTER — HEALTH MAINTENANCE LETTER (OUTPATIENT)
Age: 33
End: 2023-07-30

## 2024-09-22 ENCOUNTER — HEALTH MAINTENANCE LETTER (OUTPATIENT)
Age: 34
End: 2024-09-22